# Patient Record
Sex: FEMALE | Race: WHITE | NOT HISPANIC OR LATINO | URBAN - METROPOLITAN AREA
[De-identification: names, ages, dates, MRNs, and addresses within clinical notes are randomized per-mention and may not be internally consistent; named-entity substitution may affect disease eponyms.]

---

## 2020-05-24 ENCOUNTER — EMERGENCY (EMERGENCY)
Facility: HOSPITAL | Age: 70
LOS: 1 days | Discharge: HOME | End: 2020-05-24
Attending: EMERGENCY MEDICINE
Payer: MEDICARE

## 2020-05-24 VITALS
HEART RATE: 90 BPM | SYSTOLIC BLOOD PRESSURE: 135 MMHG | OXYGEN SATURATION: 95 % | DIASTOLIC BLOOD PRESSURE: 60 MMHG | RESPIRATION RATE: 18 BRPM | TEMPERATURE: 99 F | WEIGHT: 145.06 LBS

## 2020-05-24 LAB
ANION GAP SERPL CALC-SCNC: 16 MMOL/L — SIGNIFICANT CHANGE UP (ref 5–17)
APTT BLD: 27.5 SEC — SIGNIFICANT CHANGE UP (ref 27.5–36.3)
BASOPHILS # BLD AUTO: 0.06 K/UL — SIGNIFICANT CHANGE UP (ref 0–0.2)
BASOPHILS NFR BLD AUTO: 0.6 % — SIGNIFICANT CHANGE UP (ref 0–2)
BUN SERPL-MCNC: 18 MG/DL — SIGNIFICANT CHANGE UP (ref 8–20)
CALCIUM SERPL-MCNC: 9.6 MG/DL — SIGNIFICANT CHANGE UP (ref 8.6–10.2)
CHLORIDE SERPL-SCNC: 96 MMOL/L — LOW (ref 98–107)
CO2 SERPL-SCNC: 28 MMOL/L — SIGNIFICANT CHANGE UP (ref 22–29)
CREAT SERPL-MCNC: 0.63 MG/DL — SIGNIFICANT CHANGE UP (ref 0.5–1.3)
EOSINOPHIL # BLD AUTO: 0.12 K/UL — SIGNIFICANT CHANGE UP (ref 0–0.5)
EOSINOPHIL NFR BLD AUTO: 1.3 % — SIGNIFICANT CHANGE UP (ref 0–6)
GLUCOSE SERPL-MCNC: 252 MG/DL — HIGH (ref 70–99)
HCT VFR BLD CALC: 45.5 % — HIGH (ref 34.5–45)
HGB BLD-MCNC: 14.9 G/DL — SIGNIFICANT CHANGE UP (ref 11.5–15.5)
IMM GRANULOCYTES NFR BLD AUTO: 0.3 % — SIGNIFICANT CHANGE UP (ref 0–1.5)
INR BLD: 0.97 RATIO — SIGNIFICANT CHANGE UP (ref 0.88–1.16)
LYMPHOCYTES # BLD AUTO: 3.05 K/UL — SIGNIFICANT CHANGE UP (ref 1–3.3)
LYMPHOCYTES # BLD AUTO: 32.2 % — SIGNIFICANT CHANGE UP (ref 13–44)
MCHC RBC-ENTMCNC: 29.7 PG — SIGNIFICANT CHANGE UP (ref 27–34)
MCHC RBC-ENTMCNC: 32.7 GM/DL — SIGNIFICANT CHANGE UP (ref 32–36)
MCV RBC AUTO: 90.6 FL — SIGNIFICANT CHANGE UP (ref 80–100)
MONOCYTES # BLD AUTO: 0.67 K/UL — SIGNIFICANT CHANGE UP (ref 0–0.9)
MONOCYTES NFR BLD AUTO: 7.1 % — SIGNIFICANT CHANGE UP (ref 2–14)
NEUTROPHILS # BLD AUTO: 5.55 K/UL — SIGNIFICANT CHANGE UP (ref 1.8–7.4)
NEUTROPHILS NFR BLD AUTO: 58.5 % — SIGNIFICANT CHANGE UP (ref 43–77)
PLATELET # BLD AUTO: 240 K/UL — SIGNIFICANT CHANGE UP (ref 150–400)
POTASSIUM SERPL-MCNC: 4 MMOL/L — SIGNIFICANT CHANGE UP (ref 3.5–5.3)
POTASSIUM SERPL-SCNC: 4 MMOL/L — SIGNIFICANT CHANGE UP (ref 3.5–5.3)
PROTHROM AB SERPL-ACNC: 11 SEC — SIGNIFICANT CHANGE UP (ref 10–12.9)
RBC # BLD: 5.02 M/UL — SIGNIFICANT CHANGE UP (ref 3.8–5.2)
RBC # FLD: 13.1 % — SIGNIFICANT CHANGE UP (ref 10.3–14.5)
SODIUM SERPL-SCNC: 140 MMOL/L — SIGNIFICANT CHANGE UP (ref 135–145)
TROPONIN T SERPL-MCNC: <0.01 NG/ML — SIGNIFICANT CHANGE UP (ref 0–0.06)
WBC # BLD: 9.48 K/UL — SIGNIFICANT CHANGE UP (ref 3.8–10.5)
WBC # FLD AUTO: 9.48 K/UL — SIGNIFICANT CHANGE UP (ref 3.8–10.5)

## 2020-05-24 PROCEDURE — 70450 CT HEAD/BRAIN W/O DYE: CPT | Mod: 26

## 2020-05-24 PROCEDURE — 70450 CT HEAD/BRAIN W/O DYE: CPT

## 2020-05-24 PROCEDURE — 36415 COLL VENOUS BLD VENIPUNCTURE: CPT

## 2020-05-24 PROCEDURE — 73110 X-RAY EXAM OF WRIST: CPT

## 2020-05-24 PROCEDURE — 85610 PROTHROMBIN TIME: CPT

## 2020-05-24 PROCEDURE — 80048 BASIC METABOLIC PNL TOTAL CA: CPT

## 2020-05-24 PROCEDURE — 73110 X-RAY EXAM OF WRIST: CPT | Mod: 26,50

## 2020-05-24 PROCEDURE — 29125 APPL SHORT ARM SPLINT STATIC: CPT | Mod: RT

## 2020-05-24 PROCEDURE — 73562 X-RAY EXAM OF KNEE 3: CPT

## 2020-05-24 PROCEDURE — 73562 X-RAY EXAM OF KNEE 3: CPT | Mod: 26,RT

## 2020-05-24 PROCEDURE — 72125 CT NECK SPINE W/O DYE: CPT | Mod: 26

## 2020-05-24 PROCEDURE — 73130 X-RAY EXAM OF HAND: CPT

## 2020-05-24 PROCEDURE — 85027 COMPLETE CBC AUTOMATED: CPT

## 2020-05-24 PROCEDURE — 99284 EMERGENCY DEPT VISIT MOD MDM: CPT | Mod: 25

## 2020-05-24 PROCEDURE — 96374 THER/PROPH/DIAG INJ IV PUSH: CPT | Mod: XU

## 2020-05-24 PROCEDURE — 85730 THROMBOPLASTIN TIME PARTIAL: CPT

## 2020-05-24 PROCEDURE — 29125 APPL SHORT ARM SPLINT STATIC: CPT

## 2020-05-24 PROCEDURE — 73130 X-RAY EXAM OF HAND: CPT | Mod: 26,50

## 2020-05-24 PROCEDURE — 93005 ELECTROCARDIOGRAM TRACING: CPT

## 2020-05-24 PROCEDURE — 93010 ELECTROCARDIOGRAM REPORT: CPT

## 2020-05-24 PROCEDURE — 84484 ASSAY OF TROPONIN QUANT: CPT

## 2020-05-24 PROCEDURE — 99285 EMERGENCY DEPT VISIT HI MDM: CPT | Mod: 25

## 2020-05-24 PROCEDURE — 72125 CT NECK SPINE W/O DYE: CPT

## 2020-05-24 RX ORDER — MORPHINE SULFATE 50 MG/1
2 CAPSULE, EXTENDED RELEASE ORAL ONCE
Refills: 0 | Status: DISCONTINUED | OUTPATIENT
Start: 2020-05-24 | End: 2020-05-24

## 2020-05-24 RX ADMIN — MORPHINE SULFATE 2 MILLIGRAM(S): 50 CAPSULE, EXTENDED RELEASE ORAL at 22:17

## 2020-05-24 NOTE — ED PROVIDER NOTE - CARE PLAN
Principal Discharge DX:	Closed head injury  Secondary Diagnosis:	Acute pain of right wrist  Secondary Diagnosis:	Acute pain of right knee

## 2020-05-24 NOTE — ED PROVIDER NOTE - CLINICAL SUMMARY MEDICAL DECISION MAKING FREE TEXT BOX
CBC / CMP / Coags / Troponin / CT Head & C-spin non-con / XR B/L hands / XR B/L wrists XR right knee  Interventions: morphine 2mg IV

## 2020-05-24 NOTE — ED PROVIDER NOTE - OBJECTIVE STATEMENT
70y F s/p mechanical fall w/ additional complaint of lightheadeness prior to fall. Patient was walking down steps outside of daughters home and states she felt off balance and tripped. Patient c/o puncture wound to left frontal area. Patient c/o B/L wrist and hand pain and right knee pain. Patient has no pertinent PMH. Pt denies any chest pain, dyspnea, fevers, n/v/d, abdominal pain, dysuria, headache, cough, congestion, sore throat, neck pain, back pain, weakness, numbness, tingling, dizziness, syncope, or other complaint.

## 2020-05-24 NOTE — ED PROVIDER NOTE - NS ED ROS FT
GENERAL:   No fever, no chills  SKIN:   No Rashes  EYE:   Vision unchanged.  ENMT:   Denies ear pain.   PULM:   Denies SOB.  CVS:   Denies Chest Pain.  GI:   Denies abdominal pain.  : Denies dysuria.   MSK: +right knee pain, +B/L wrist and hand pain  NEURO: Denies HA.

## 2020-05-24 NOTE — ED PROVIDER NOTE - PHYSICAL EXAMINATION
GENERAL:  WNWD Elderly F, alert, cooperative during history and exam  EYE: EOM grossly intact, symmetrical lids, normal conjunctiva  ENMT: Atraumatic external nose and ears, moist mucous membranes  NECK: Symmetric, no tracheal deviation  CVS: RRR, No murmurs appreciated.  RESP: Unlabored respiratory effort, no central cyanosis, no evidence for respiratory distress, lungs CTAB  GI: Abdomen is non-distended, soft and non-tender.  MSK: No gross deformities or edema. TTP elicited B/L wrists, R > L, point tenderness at right distal radius. TTP elicited over right knee. No pelvic instability. No TTP elicited elsewhere. Patient having difficulty bearing weight on RLE.  DERM: Skin is warm, dry. 1 sub-cm puncture wound overlying left eyebrow, scattered abrasions at left lateral periorbital soft tissues, no active bleeding.   NEURO: AAOx3. Moving all 4 extremities spontaneously.  PSYCH: Appropriate mood and affect during encounter.

## 2020-05-24 NOTE — ED ADULT TRIAGE NOTE - CHIEF COMPLAINT QUOTE
fall while outside pt states was dizzy prior to fall denies chest pain. now c/o left sided head pain abrasion to left forehead and b/l wrist pain. denies LOC denies blood thinners

## 2020-05-24 NOTE — ED ADULT NURSE NOTE - NSIMPLEMENTINTERV_GEN_ALL_ED
Implemented All Fall Risk Interventions:  [place] to call system. Call bell, personal items and telephone within reach. Instruct patient to call for assistance. Room bathroom lighting operational. Non-slip footwear when patient is off stretcher. Physically safe environment: no spills, clutter or unnecessary equipment. Stretcher in lowest position, wheels locked, appropriate side rails in place. Provide visual cue, wrist band, yellow gown, etc. Monitor gait and stability. Monitor for mental status changes and reorient to person, place, and time. Review medications for side effects contributing to fall risk. Reinforce activity limits and safety measures with patient and family.

## 2020-05-24 NOTE — ED PROVIDER NOTE - NSFOLLOWUPCLINICS_GEN_ALL_ED_FT
[facility]  Orthopedics  [address] [place] [id number]  Phone: [phone]  Fax:   Follow Up Time: 1-3 Days

## 2020-05-24 NOTE — ED ADULT NURSE REASSESSMENT NOTE - NS ED NURSE REASSESS COMMENT FT1
Daughter of patient here with registration and  to verify patient is her father. She confirmed at bedside and patient did as well. Patient dressed himself with clothing recovered from  without incident. No distress noted when patient ambulated to stretcher from restroom approximate 30 feet.

## 2020-05-24 NOTE — ED PROVIDER NOTE - NSFOLLOWUPINSTRUCTIONS_ED_ALL_ED_FT
1. Follow up with orthopedic surgery 2-3days for reevaluation.  2.  Return to the Emergency Department for worsening, progressive or any other concerning symptoms.       Radial Fracture     A radial fracture is a break in the radius bone. The radius is a bone in the forearm, on the same side as the thumb. The forearm is the part of the arm that is between the elbow and the wrist. A radial fracture near the wrist (distal radialfracture) is the most common type of broken arm. A fracture can also occur near the elbow (radial head fracture).  What are the causes?  The most common cause of a radial fracture is falling with the arm outstretched. Other causes include:  An accident, such as a car or bike accident.A hard, direct hit to the forearm.What increases the risk?  You may be at greater risk for a radial fracture if you:  Are female.Are an older adult.Play contact sports.Have a condition that causes your bones to become thin and brittle (osteoporosis).What are the signs or symptoms?  A radial fracture causes pain immediately after the injury. Other signs and symptoms may include:  An abnormal bend or bump in the arm (deformity).Swelling.Bruising.Numbness or tingling in your arm and hand.Limited movement of your arm and hand.How is this diagnosed?  This condition may be diagnosed based on:  Your symptoms and medical history.A physical exam.An X-ray.How is this treated?  Treatment depends on how severe your fracture is, where it is, and how the pieces of the broken bone line up with each other (alignment).  The first step may be for you to wear a temporary splint for a few days, until your swelling goes down. After the swelling goes down, you may get a cast, get a different type of splint, or have surgery.If your broken bone is in good alignment, you will need to wear a splint or cast for up to 6 weeks.If your broken bone is not aligned (is displaced), your health care provider will need to align the bone pieces. After alignment, you will need to wear a splint or cast for up to 6 weeks. To align your broken bone, your health care provider may:  Move the bones back into position without surgery (closed reduction).Perform surgery to align the fracture and fix the bone pieces into place with metal screws, plates, or wires (open reduction and internal fixation, ORIF).Perform surgery to align the fracture and fix the bone pieces into place with pins that are attached to a stabilizing bar outside your skin (external fixation).Treatment may also include:  Having your cast changed after 2–3 weeks.Physical therapy.Follow-up visits and X-rays to make sure you are healing.Follow these instructions at home:  If you have a splint:     Wear it as told by your health care provider. Remove it only as told by your health care provider. Loosen the splint if your fingers tingle, become numb, or turn cold and blue. Keep the splint clean and dry.If you have a cast:     Do not stick anything inside the cast to scratch your skin. Doing that increases your risk for infection. Check the skin around the cast every day. Tell your health care provider about any concerns. You may put lotion on dry skin around the edges of the cast. Do not put lotion on the skin underneath the cast.Keep the cast clean and dry.Bathing     Do not take baths, swim, or use a hot tub until your health care provider approves. Ask your health care provider if you may take showers. You may only be allowed to take sponge baths.If your splint or cast is not waterproof:  Do not let it get wet.Cover it with a watertight covering when you take a bath or a shower.Activity     Do not lift anything with your injured arm.Do not use the injured arm to support your body weight until your health care provider says that you can.Ask your health care provider what activities are safe for you during recovery, and ask what activities you need to avoid.Managing pain, stiffness, and swelling        If directed, put ice on painful areas:   If you have a removable splint, remove it as told by your health care provider. Put ice in a plastic bag. Place a towel between your skin and the bag, or between your cast and the bag.Leave the ice on for 20 minutes, 2–3 times a day.Move your fingers often to avoid stiffness and to lessen swelling. Raise (elevate) your arm above the level of your heart while you are sitting or lying down.General instructions     Do not put pressure on any part of the cast or splint until it is fully hardened, if applicable. This may take several hours. Take over-the-counter and prescription medicines only as told by your health care provider.Do not drive until your health care provider approves. You should not drive or use heavy machinery while taking prescription pain medicine. Do not use any products that contain nicotine or tobacco, such as cigarettes and e-cigarettes. These can delay bone healing. If you need help quitting, ask your health care provider.Keep all follow-up visits as told by your health care provider. This is important.Contact a health care provider if you have:  Pain that does not get better with medicine.Swelling that gets worse.A bad smell coming from your cast.Get help right away if:  You cannot move your fingers.You have severe pain.Your fingers or your hand:  Become numb, cold, or pale.Turn a bluish color.Summary  A radial fracture is a break in the radius bone. The radius is in the forearm, on the same side as the thumb.Treatment depends on how severe your fracture is, where it is, and how the pieces of the broken bone line up with each other.A splint or cast may be needed to help the fracture heal. A more severe break may require surgery.This information is not intended to replace advice given to you by your health care provider. Make sure you discuss any questions you have with your health care provider.        Laceration    A laceration is a cut that goes through all of the layers of the skin and into the tissue that is right under the skin. Some lacerations heal on their own. Others need to be closed with skin adhesive strips, skin glue, stitches (sutures), or staples. Proper laceration care minimizes the risk of infection and helps the laceration to heal better.  If non-absorbable stitches or staples have been placed, they must be taken out within the time frame instructed by your healthcare provider.    SEEK IMMEDIATE MEDICAL CARE IF YOU HAVE ANY OF THE FOLLOWING SYMPTOMS: swelling around the wound, worsening pain, drainage from the wound, red streaking going away from your wound, inability to move finger or toe near the laceration, or discoloration of skin near the laceration.

## 2020-05-24 NOTE — ED PROVIDER NOTE - PATIENT PORTAL LINK FT
You can access the FollowMyHealth Patient Portal offered by [facility] by registering at the following website: [email or web address]. By joining [facility]’s FollowMyHealth portal, you will also be able to view your health information using other applications (apps) compatible with our system.

## 2020-05-24 NOTE — ED PROVIDER NOTE - ATTENDING CONTRIBUTION TO CARE
Pt. awake and alert. Pt. in no distress. +Tenderness to b/l wrist. +Tenderness to Right knee. FROM of Right knee.  I, Dr. Horne, performed a face to face bedside interview with this patient regarding history of present illness, review of symptoms and relevant past medical, social and family history.  I completed an independent physical examination.  I have also reviewed the resident's note(s) and discussed the plan with the resident.

## 2020-05-24 NOTE — ED PROVIDER NOTE - PROGRESS NOTE DETAILS
Tristen Scott MD, FRANCISCA: imaging and labs pending at sign out. Patient placed in right radial gutter splint. Family informed by attending Dr. Horne. Case signed out to Dr. Wang by Dr. Josue Horne. Pending CT Head & C-spine. 2mg morphine IV given. Patient signed out to me by Dr. Scott and Dr. Horne. CT head neg. Splint placed. Will DC home with ortho f/u. Kristin Corbett DO

## 2020-05-25 VITALS
RESPIRATION RATE: 16 BRPM | HEART RATE: 81 BPM | DIASTOLIC BLOOD PRESSURE: 62 MMHG | OXYGEN SATURATION: 97 % | TEMPERATURE: 99 F | SYSTOLIC BLOOD PRESSURE: 127 MMHG

## 2020-05-26 PROBLEM — Z78.9 OTHER SPECIFIED HEALTH STATUS: Chronic | Status: ACTIVE | Noted: 2020-05-24

## 2020-06-04 PROBLEM — Z00.00 ENCOUNTER FOR PREVENTIVE HEALTH EXAMINATION: Status: ACTIVE | Noted: 2020-06-04

## 2020-06-16 ENCOUNTER — APPOINTMENT (OUTPATIENT)
Dept: ORTHOPEDIC SURGERY | Facility: CLINIC | Age: 70
End: 2020-06-16

## 2021-09-20 NOTE — ED PROVIDER NOTE - NS ED MD EM SELECTION
63508 Comprehensive Partial Purse String (Simple) Text: Given the location of the defect and the characteristics of the surrounding skin a simple purse string closure was deemed most appropriate.  Undermining was performed circumfirentially around the surgical defect.  A purse string suture was then placed and tightened. Wound tension only allowed a partial closure of the circular defect.

## 2022-04-27 ENCOUNTER — APPOINTMENT (OUTPATIENT)
Dept: FAMILY MEDICINE | Facility: CLINIC | Age: 72
End: 2022-04-27

## 2022-08-04 NOTE — ED ADULT NURSE NOTE - PAIN RATING/NUMBER SCALE (0-10): REST
Arriving from  c/o abd pain, HA, dizziness, body aches and N/V.    P 128  RR 32  Temp 99.5    EKG done    Report received by Niki Valle RN  6:36 PM     6